# Patient Record
Sex: MALE | Race: WHITE | Employment: OTHER | URBAN - METROPOLITAN AREA
[De-identification: names, ages, dates, MRNs, and addresses within clinical notes are randomized per-mention and may not be internally consistent; named-entity substitution may affect disease eponyms.]

---

## 2017-06-06 ENCOUNTER — HOSPITAL ENCOUNTER (EMERGENCY)
Age: 62
Discharge: LEFT BEFORE DISCHARGE | End: 2017-06-06
Attending: EMERGENCY MEDICINE
Payer: COMMERCIAL

## 2017-06-06 VITALS
SYSTOLIC BLOOD PRESSURE: 157 MMHG | WEIGHT: 186.6 LBS | HEIGHT: 67 IN | DIASTOLIC BLOOD PRESSURE: 82 MMHG | RESPIRATION RATE: 16 BRPM | HEART RATE: 73 BPM | OXYGEN SATURATION: 96 % | BODY MASS INDEX: 29.29 KG/M2 | TEMPERATURE: 97.7 F

## 2017-06-06 PROCEDURE — 75810000275 HC EMERGENCY DEPT VISIT NO LEVEL OF CARE

## 2017-06-06 NOTE — ED TRIAGE NOTES
Patient arrives after falling Saturday from a ladder onto a stump approx 4 feet. Patient reports LLQ tenderness and bruising to scrotum. Denies difficulty with urination. Reports nausea but denies V/D. \"I basically would like a ultrasound. \" No acute distress noted in triage. VSS.

## 2018-12-20 ENCOUNTER — HOME HEALTH ADMISSION (OUTPATIENT)
Dept: HOME HEALTH SERVICES | Facility: HOME HEALTH | Age: 63
End: 2018-12-20
Payer: COMMERCIAL

## 2018-12-21 ENCOUNTER — HOME CARE VISIT (OUTPATIENT)
Dept: SCHEDULING | Facility: HOME HEALTH | Age: 63
End: 2018-12-21
Payer: COMMERCIAL

## 2018-12-21 VITALS
TEMPERATURE: 98.5 F | HEART RATE: 72 BPM | DIASTOLIC BLOOD PRESSURE: 67 MMHG | RESPIRATION RATE: 20 BRPM | OXYGEN SATURATION: 97 % | SYSTOLIC BLOOD PRESSURE: 130 MMHG

## 2018-12-21 PROCEDURE — 400013 HH SOC

## 2018-12-21 PROCEDURE — G0151 HHCP-SERV OF PT,EA 15 MIN: HCPCS

## 2018-12-24 ENCOUNTER — HOME CARE VISIT (OUTPATIENT)
Dept: SCHEDULING | Facility: HOME HEALTH | Age: 63
End: 2018-12-24
Payer: COMMERCIAL

## 2018-12-24 PROCEDURE — G0151 HHCP-SERV OF PT,EA 15 MIN: HCPCS

## 2018-12-26 VITALS
HEART RATE: 78 BPM | TEMPERATURE: 95.6 F | OXYGEN SATURATION: 98 % | DIASTOLIC BLOOD PRESSURE: 77 MMHG | RESPIRATION RATE: 21 BRPM | SYSTOLIC BLOOD PRESSURE: 131 MMHG

## 2018-12-28 ENCOUNTER — HOME CARE VISIT (OUTPATIENT)
Dept: SCHEDULING | Facility: HOME HEALTH | Age: 63
End: 2018-12-28
Payer: COMMERCIAL

## 2018-12-28 VITALS
OXYGEN SATURATION: 98 % | SYSTOLIC BLOOD PRESSURE: 125 MMHG | RESPIRATION RATE: 20 BRPM | HEART RATE: 76 BPM | TEMPERATURE: 97.5 F | DIASTOLIC BLOOD PRESSURE: 87 MMHG

## 2018-12-28 PROCEDURE — G0151 HHCP-SERV OF PT,EA 15 MIN: HCPCS

## 2019-01-02 ENCOUNTER — HOME CARE VISIT (OUTPATIENT)
Dept: SCHEDULING | Facility: HOME HEALTH | Age: 64
End: 2019-01-02
Payer: COMMERCIAL

## 2019-01-02 VITALS
SYSTOLIC BLOOD PRESSURE: 131 MMHG | OXYGEN SATURATION: 98 % | HEART RATE: 71 BPM | RESPIRATION RATE: 20 BRPM | DIASTOLIC BLOOD PRESSURE: 78 MMHG | TEMPERATURE: 97.9 F

## 2019-01-02 PROCEDURE — G0151 HHCP-SERV OF PT,EA 15 MIN: HCPCS

## 2022-04-29 ENCOUNTER — TRANSCRIBE ORDER (OUTPATIENT)
Dept: CARDIAC REHAB | Age: 67
End: 2022-04-29

## 2022-04-29 ENCOUNTER — HOSPITAL ENCOUNTER (OUTPATIENT)
Age: 67
Discharge: HOME | End: 2022-04-30
Attending: INTERNAL MEDICINE | Admitting: INTERNAL MEDICINE
Payer: MEDICARE

## 2022-04-29 DIAGNOSIS — R07.9 CHEST PAIN, UNSPECIFIED TYPE: ICD-10-CM

## 2022-04-29 PROBLEM — I20.8 ANGINA OF EFFORT (HCC): Status: ACTIVE | Noted: 2022-04-29

## 2022-04-29 PROBLEM — I20.89 ANGINA OF EFFORT: Status: ACTIVE | Noted: 2022-04-29

## 2022-04-29 LAB
ACT BLD: 309 SECS (ref 79–138)
ANION GAP SERPL CALC-SCNC: 4 MMOL/L (ref 5–15)
BUN SERPL-MCNC: 33 MG/DL (ref 6–20)
BUN/CREAT SERPL: 32 (ref 12–20)
CALCIUM SERPL-MCNC: 8.9 MG/DL (ref 8.5–10.1)
CHLORIDE SERPL-SCNC: 107 MMOL/L (ref 97–108)
CO2 SERPL-SCNC: 27 MMOL/L (ref 21–32)
CREAT SERPL-MCNC: 1.04 MG/DL (ref 0.7–1.3)
ERYTHROCYTE [DISTWIDTH] IN BLOOD BY AUTOMATED COUNT: 13.1 % (ref 11.5–14.5)
GLUCOSE SERPL-MCNC: 103 MG/DL (ref 65–100)
HCT VFR BLD AUTO: 48.9 % (ref 36.6–50.3)
HGB BLD-MCNC: 16.5 G/DL (ref 12.1–17)
MCH RBC QN AUTO: 32.5 PG (ref 26–34)
MCHC RBC AUTO-ENTMCNC: 33.7 G/DL (ref 30–36.5)
MCV RBC AUTO: 96.3 FL (ref 80–99)
NRBC # BLD: 0 K/UL (ref 0–0.01)
NRBC BLD-RTO: 0 PER 100 WBC
PLATELET # BLD AUTO: 262 K/UL (ref 150–400)
PMV BLD AUTO: 9 FL (ref 8.9–12.9)
POTASSIUM SERPL-SCNC: 4.2 MMOL/L (ref 3.5–5.1)
RBC # BLD AUTO: 5.08 M/UL (ref 4.1–5.7)
SODIUM SERPL-SCNC: 138 MMOL/L (ref 136–145)
WBC # BLD AUTO: 8.8 K/UL (ref 4.1–11.1)

## 2022-04-29 PROCEDURE — 85347 COAGULATION TIME ACTIVATED: CPT

## 2022-04-29 PROCEDURE — 92928 PRQ TCAT PLMT NTRAC ST 1 LES: CPT | Performed by: INTERNAL MEDICINE

## 2022-04-29 PROCEDURE — 77010033678 HC OXYGEN DAILY

## 2022-04-29 PROCEDURE — 93005 ELECTROCARDIOGRAM TRACING: CPT

## 2022-04-29 PROCEDURE — C1769 GUIDE WIRE: HCPCS | Performed by: INTERNAL MEDICINE

## 2022-04-29 PROCEDURE — C1894 INTRO/SHEATH, NON-LASER: HCPCS | Performed by: INTERNAL MEDICINE

## 2022-04-29 PROCEDURE — 77030042317 HC BND COMPR HEMSTAT -B: Performed by: INTERNAL MEDICINE

## 2022-04-29 PROCEDURE — 2709999900 HC NON-CHARGEABLE SUPPLY: Performed by: INTERNAL MEDICINE

## 2022-04-29 PROCEDURE — 77030008543 HC TBNG MON PRSS MRTM -A: Performed by: INTERNAL MEDICINE

## 2022-04-29 PROCEDURE — 36415 COLL VENOUS BLD VENIPUNCTURE: CPT

## 2022-04-29 PROCEDURE — 74011250637 HC RX REV CODE- 250/637: Performed by: STUDENT IN AN ORGANIZED HEALTH CARE EDUCATION/TRAINING PROGRAM

## 2022-04-29 PROCEDURE — 74011000636 HC RX REV CODE- 636: Performed by: INTERNAL MEDICINE

## 2022-04-29 PROCEDURE — C1887 CATHETER, GUIDING: HCPCS | Performed by: INTERNAL MEDICINE

## 2022-04-29 PROCEDURE — 77030030195 HC CATH ANGI DX PRF4 MRTM -A: Performed by: INTERNAL MEDICINE

## 2022-04-29 PROCEDURE — 77030019698 HC SYR ANGI MDLON MRTM -A: Performed by: INTERNAL MEDICINE

## 2022-04-29 PROCEDURE — 74011250637 HC RX REV CODE- 250/637: Performed by: INTERNAL MEDICINE

## 2022-04-29 PROCEDURE — 93458 L HRT ARTERY/VENTRICLE ANGIO: CPT | Performed by: INTERNAL MEDICINE

## 2022-04-29 PROCEDURE — 77030010221 HC SPLNT WR POS TELE -B: Performed by: INTERNAL MEDICINE

## 2022-04-29 PROCEDURE — 99153 MOD SED SAME PHYS/QHP EA: CPT | Performed by: INTERNAL MEDICINE

## 2022-04-29 PROCEDURE — C1725 CATH, TRANSLUMIN NON-LASER: HCPCS | Performed by: INTERNAL MEDICINE

## 2022-04-29 PROCEDURE — 80048 BASIC METABOLIC PNL TOTAL CA: CPT

## 2022-04-29 PROCEDURE — 74011250636 HC RX REV CODE- 250/636: Performed by: INTERNAL MEDICINE

## 2022-04-29 PROCEDURE — C1874 STENT, COATED/COV W/DEL SYS: HCPCS | Performed by: INTERNAL MEDICINE

## 2022-04-29 PROCEDURE — 77030019697 HC SYR ANGI INFL MRTM -B: Performed by: INTERNAL MEDICINE

## 2022-04-29 PROCEDURE — 99152 MOD SED SAME PHYS/QHP 5/>YRS: CPT | Performed by: INTERNAL MEDICINE

## 2022-04-29 PROCEDURE — 77030004549 HC CATH ANGI DX PRF MRTM -A: Performed by: INTERNAL MEDICINE

## 2022-04-29 PROCEDURE — 74011000250 HC RX REV CODE- 250: Performed by: INTERNAL MEDICINE

## 2022-04-29 PROCEDURE — 85027 COMPLETE CBC AUTOMATED: CPT

## 2022-04-29 DEVICE — STENT RONYX25022UX RESOLUTE ONYX 2.50X22
Type: IMPLANTABLE DEVICE | Status: FUNCTIONAL
Brand: RESOLUTE ONYX™

## 2022-04-29 RX ORDER — LIDOCAINE HYDROCHLORIDE 10 MG/ML
INJECTION, SOLUTION EPIDURAL; INFILTRATION; INTRACAUDAL; PERINEURAL AS NEEDED
Status: DISCONTINUED | OUTPATIENT
Start: 2022-04-29 | End: 2022-04-29 | Stop reason: HOSPADM

## 2022-04-29 RX ORDER — MELOXICAM 7.5 MG/1
15 TABLET ORAL DAILY
Status: DISCONTINUED | OUTPATIENT
Start: 2022-04-30 | End: 2022-04-30 | Stop reason: HOSPADM

## 2022-04-29 RX ORDER — ZOLPIDEM TARTRATE 5 MG/1
5 TABLET ORAL
Status: DISCONTINUED | OUTPATIENT
Start: 2022-04-29 | End: 2022-04-30 | Stop reason: HOSPADM

## 2022-04-29 RX ORDER — TRAZODONE HYDROCHLORIDE 50 MG/1
50 TABLET ORAL
Status: DISCONTINUED | OUTPATIENT
Start: 2022-04-29 | End: 2022-04-30 | Stop reason: HOSPADM

## 2022-04-29 RX ORDER — MIDAZOLAM HYDROCHLORIDE 1 MG/ML
INJECTION, SOLUTION INTRAMUSCULAR; INTRAVENOUS AS NEEDED
Status: DISCONTINUED | OUTPATIENT
Start: 2022-04-29 | End: 2022-04-29 | Stop reason: HOSPADM

## 2022-04-29 RX ORDER — LORAZEPAM 1 MG/1
1 TABLET ORAL
Status: DISCONTINUED | OUTPATIENT
Start: 2022-04-29 | End: 2022-04-30 | Stop reason: HOSPADM

## 2022-04-29 RX ORDER — FENTANYL CITRATE 50 UG/ML
INJECTION, SOLUTION INTRAMUSCULAR; INTRAVENOUS AS NEEDED
Status: DISCONTINUED | OUTPATIENT
Start: 2022-04-29 | End: 2022-04-29 | Stop reason: HOSPADM

## 2022-04-29 RX ORDER — CLOPIDOGREL BISULFATE 75 MG/1
TABLET ORAL AS NEEDED
Status: DISCONTINUED | OUTPATIENT
Start: 2022-04-29 | End: 2022-04-29 | Stop reason: HOSPADM

## 2022-04-29 RX ORDER — ANASTROZOLE 1 MG/1
1 TABLET ORAL
COMMUNITY

## 2022-04-29 RX ORDER — HEPARIN SODIUM 200 [USP'U]/100ML
INJECTION, SOLUTION INTRAVENOUS
Status: COMPLETED | OUTPATIENT
Start: 2022-04-29 | End: 2022-04-29

## 2022-04-29 RX ORDER — METOPROLOL SUCCINATE 50 MG/1
50 TABLET, EXTENDED RELEASE ORAL DAILY
Status: DISCONTINUED | OUTPATIENT
Start: 2022-04-30 | End: 2022-04-30 | Stop reason: HOSPADM

## 2022-04-29 RX ORDER — SODIUM CHLORIDE 0.9 % (FLUSH) 0.9 %
5-40 SYRINGE (ML) INJECTION EVERY 8 HOURS
Status: DISCONTINUED | OUTPATIENT
Start: 2022-04-29 | End: 2022-04-30 | Stop reason: HOSPADM

## 2022-04-29 RX ORDER — CLOPIDOGREL BISULFATE 75 MG/1
75 TABLET ORAL DAILY
Qty: 30 TABLET | Refills: 12 | Status: SHIPPED | OUTPATIENT
Start: 2022-04-30

## 2022-04-29 RX ORDER — CLOPIDOGREL BISULFATE 75 MG/1
75 TABLET ORAL DAILY
Status: DISCONTINUED | OUTPATIENT
Start: 2022-04-30 | End: 2022-04-30 | Stop reason: HOSPADM

## 2022-04-29 RX ORDER — HEPARIN SODIUM 1000 [USP'U]/ML
INJECTION, SOLUTION INTRAVENOUS; SUBCUTANEOUS AS NEEDED
Status: DISCONTINUED | OUTPATIENT
Start: 2022-04-29 | End: 2022-04-29 | Stop reason: HOSPADM

## 2022-04-29 RX ORDER — VERAPAMIL HYDROCHLORIDE 2.5 MG/ML
INJECTION, SOLUTION INTRAVENOUS AS NEEDED
Status: DISCONTINUED | OUTPATIENT
Start: 2022-04-29 | End: 2022-04-29 | Stop reason: HOSPADM

## 2022-04-29 RX ORDER — ACETAMINOPHEN 325 MG/1
650 TABLET ORAL
Status: DISCONTINUED | OUTPATIENT
Start: 2022-04-29 | End: 2022-04-30 | Stop reason: HOSPADM

## 2022-04-29 RX ORDER — TRAZODONE HYDROCHLORIDE 50 MG/1
50 TABLET ORAL
COMMUNITY

## 2022-04-29 RX ORDER — LOSARTAN POTASSIUM 50 MG/1
50 TABLET ORAL DAILY
COMMUNITY

## 2022-04-29 RX ORDER — TESTOSTERONE ENANTHATE 75 MG/.5ML
75 INJECTION SUBCUTANEOUS
COMMUNITY

## 2022-04-29 RX ORDER — GUAIFENESIN 100 MG/5ML
81 LIQUID (ML) ORAL DAILY
Status: DISCONTINUED | OUTPATIENT
Start: 2022-04-30 | End: 2022-04-30 | Stop reason: HOSPADM

## 2022-04-29 RX ORDER — SODIUM CHLORIDE 0.9 % (FLUSH) 0.9 %
5-40 SYRINGE (ML) INJECTION AS NEEDED
Status: DISCONTINUED | OUTPATIENT
Start: 2022-04-29 | End: 2022-04-30 | Stop reason: HOSPADM

## 2022-04-29 RX ORDER — FAMOTIDINE 20 MG/1
20 TABLET, FILM COATED ORAL DAILY
Status: DISCONTINUED | OUTPATIENT
Start: 2022-04-30 | End: 2022-04-30 | Stop reason: HOSPADM

## 2022-04-29 RX ORDER — SODIUM CHLORIDE 9 MG/ML
100 INJECTION, SOLUTION INTRAVENOUS CONTINUOUS
Status: DISPENSED | OUTPATIENT
Start: 2022-04-29 | End: 2022-04-29

## 2022-04-29 RX ORDER — LOSARTAN POTASSIUM 50 MG/1
50 TABLET ORAL DAILY
Status: DISCONTINUED | OUTPATIENT
Start: 2022-04-30 | End: 2022-04-30 | Stop reason: HOSPADM

## 2022-04-29 RX ORDER — METOPROLOL SUCCINATE 50 MG/1
50 TABLET, EXTENDED RELEASE ORAL DAILY
COMMUNITY

## 2022-04-29 RX ADMIN — ACETAMINOPHEN 325MG 650 MG: 325 TABLET ORAL at 20:57

## 2022-04-29 RX ADMIN — SODIUM CHLORIDE, PRESERVATIVE FREE 10 ML: 5 INJECTION INTRAVENOUS at 22:00

## 2022-04-29 RX ADMIN — TRAZODONE HYDROCHLORIDE 50 MG: 50 TABLET ORAL at 21:57

## 2022-04-29 NOTE — Clinical Note
Diagnostic catheter removed over the wire. Catheter used: Pigtail (straight). Balloon/Stent status: deployed.

## 2022-04-29 NOTE — PROCEDURES
Brief Procedure Note    Patient: Clarice Bower MRN: [id number]  SSN: [id number]    YOB: 1955  Age: 75 y.o. Sex: male      Date of Procedure: 4/29/2022     Pre-procedure Diagnosis: angina, CAD      Post-procedure Diagnosis: angina,  CAD     Procedure: L Cath . PCI of prox - Mid LAD     Performed By: Chaitanya Oden MD     Anesthesia: Moderate Sedation    Estimated Blood Loss: Less than 10 mL      Specimens:  None    Findings: 75 - 80% LAD stenosis just distal to Diagonal branch. Remainder of coronaries normal      Left Dominant . Normal LV      PCI of LAD with 2.5 x 22 Stopover VAN to 15 GRAHAM     Complications: None    Implants: Stopover VAN 2.5 x 22  prox - mid LAD . Recommendations: Continue medical therapy.     Signed By: Chaitanya Oden MD     April 29, 2022

## 2022-04-29 NOTE — H&P
Consult/Admission    NAME: Jackson Murray   :  1955   MRN:  [id number]     Date/Time:  2022 1:11 PM    Patient PCP: Chanell Sarah MD  ________________________________________________________________________      Patient seen and examined 12:30 pm    Procedure reviewed , all questions answered. Assessment:     Angina    CAD on recent CTA   Elevated Calcium score 1100 . Diffuse calcification   Hx Of Atrial arrhythmia. SVT . S/p Ablation   S/p Medtronic DDD pacer  . Hypertension   Hyperlipidemia   Family hx of CAD . S/p 7 artificial joint replacements. Last cardiac cath  did not show any obstructive disease. Intolerant of Statins       Plan:     Proceed with diagnostic cath ,   PCI if indicated      []           High complexity decision making was performed        Subjective:   CHIEF COMPLAINT: chest pain ,   SOB     HISTORY OF PRESENT ILLNESS:     Chari Wu is a 75 y.o.  male who has above diagnoses. Well known to me . Follows with DR Tash Garcia in our practice for his arrhythmias. Has recently been experiencing symptoms when exercising with chest discomfort , increased SOB ,  Persistent Tachycardia,  Profuse diaphoresis . Central chest symptoms , no radiation . CTA of coronaries done . He has heavily calcified coronaries. Suspicious for high grade proximal LAD stenosis .           Past Medical History:   Diagnosis Date    Arthritis     Chronic pain     bad arthritis    Hypertension     Kidney stone       Past Surgical History:   Procedure Laterality Date    HX HEENT      sinus, break nose     HX ORTHOPAEDIC      Bilat Knee Replacement, Bilat Shoulder Replaement     Allergies   Allergen Reactions    Penicillin G Unknown (comments)      Meds:  See below  Social History     Tobacco Use    Smoking status: Former Smoker     Quit date: 1993     Years since quittin.2    Smokeless tobacco: Not on file   Substance Use Topics    Alcohol use: No      Family History   Problem Relation Age of Onset    Heart Disease Mother     Heart Disease Father     Cancer Father         Prostate CA    Arrhythmia Sister         A Fib    Heart Disease Brother         MI 62       REVIEW OF SYSTEMS:     []            Unable to obtain  ROS due to ---   [x]            Total of 12 systems reviewed as follows:    Constitutional: negative fever, negative chills, negative weight loss  Eyes:   negative visual changes  ENT:   negative sore throat, tongue or lip swelling  Respiratory:  negative cough, negative dyspnea  Cards:  negative for chest pain, palpitations, lower extremity edema  GI:   negative for nausea, vomiting, diarrhea, and abdominal pain  Genitourinary: negative for frequency, dysuria  Integument:  negative for rash   Hematologic:  negative for easy bruising and gum/nose bleeding  Musculoskel: negative for myalgias,  back pain  Neurological:  negative for headaches, dizziness, vertigo, weakness  Behavl/Psych: negative for feelings of anxiety, depression     Pertinent Positives include :    Objective:      Physical Exam:    Last 24hrs VS reviewed since prior progress note. Most recent are:    Visit Vitals  BP (!) 173/80 (BP 1 Location: Left upper arm, BP Patient Position: At rest)   Pulse 60   Temp 98.2 °F (36.8 °C)   Resp 18   Ht 5' 7\" (1.702 m)   Wt 78.5 kg (173 lb)   SpO2 99%   BMI 27.10 kg/m²     No intake or output data in the 24 hours ending 04/29/22 1311     General Appearance: Well developed, well nourished, alert & oriented x 3,    no acute distress. Ears/Nose/Mouth/Throat: Pupils equal and round, Hearing grossly normal.  Neck: Supple. JVP within normal limits. Carotids good upstrokes, with no bruit. Chest: Lungs clear to auscultation bilaterally. Cardiovascular: Regular rate and rhythm, S1S2 normal, no murmur, rubs, gallops. Abdomen: Soft, non-tender, bowel sounds are active. No organomegaly. Extremities: No edema bilaterally.  Femoral pulses +2, Distal Pulses +1. Skin: Warm and dry. Neuro: CN II-XII grossly intact, Strength and sensation grossly intact. Data:      Prior to Admission medications    Medication Sig Start Date End Date Taking? Authorizing Provider   metoprolol succinate (TOPROL-XL) 50 mg XL tablet Take 50 mg by mouth daily. Yes Provider, Historical   losartan (COZAAR) 50 mg tablet Take 50 mg by mouth daily. Yes Provider, Historical   anastrozole (ARIMIDEX) 1 mg tablet Take 1 mg by mouth every seven (7) days. Yes Provider, Historical   traZODone (DESYREL) 50 mg tablet Take 50 mg by mouth nightly as needed for Sleep. Yes Provider, Historical   testosterone enanthate (Xyosted) 75 mg/0.5 mL atIn 75 mg by SubCUTAneous route every seven (7) days. Yes Provider, Historical   aspirin 81 mg chewable tablet Take 81 mg by mouth two (2) times a day. after the Lovenox injections end on 12/2/3/18   Yes To Austin MD   Esomeprazole Magnesium Take 40 mg by mouth daily. Yes Olena Mantilla MD   meloxicam (MOBIC) 15 mg tablet Take 15 mg by mouth daily. Yes Ana Rodriguez MD   hydrochlorothiazide (HYDRODIURIL) 50 mg tablet Take 25 mg by mouth daily. Yes Provider, Historical   famotidine (PEPCID) 20 mg tablet Take 20 mg by mouth daily.    Yes Provider, Historical       Recent Results (from the past 24 hour(s))   METABOLIC PANEL, BASIC    Collection Time: 04/29/22 10:39 AM   Result Value Ref Range    Sodium 138 136 - 145 mmol/L    Potassium 4.2 3.5 - 5.1 mmol/L    Chloride 107 97 - 108 mmol/L    CO2 27 21 - 32 mmol/L    Anion gap 4 (L) 5 - 15 mmol/L    Glucose 103 (H) 65 - 100 mg/dL    BUN 33 (H) 6 - 20 MG/DL    Creatinine 1.04 0.70 - 1.30 MG/DL    BUN/Creatinine ratio 32 (H) 12 - 20      GFR est AA >60 >60 ml/min/1.73m2    GFR est non-AA >60 >60 ml/min/1.73m2    Calcium 8.9 8.5 - 10.1 MG/DL   CBC W/O DIFF    Collection Time: 04/29/22 10:39 AM   Result Value Ref Range    WBC 8.8 4.1 - 11.1 K/uL    RBC 5.08 4.10 - 5.70 M/uL    HGB 16.5 12.1 - 17.0 g/dL    HCT 48.9 36.6 - 50.3 %    MCV 96.3 80.0 - 99.0 FL    MCH 32.5 26.0 - 34.0 PG    MCHC 33.7 30.0 - 36.5 g/dL    RDW 13.1 11.5 - 14.5 %    PLATELET 125 069 - 062 K/uL    MPV 9.0 8.9 - 12.9 FL    NRBC 0.0 0  WBC    ABSOLUTE NRBC 0.00 0.00 - 0.01 K/uL

## 2022-04-29 NOTE — PROGRESS NOTES
Cardiac Cath Lab Recovery Arrival Note:      Clarice Bower arrived to Cardiac Cath Lab, Recovery Area. Staff introduced to patient. Patient identifiers verified with NAME and DATE OF BIRTH. Procedure verified with patient. Consent forms reviewed and signed by patient or authorized representative and verified. Allergies verified. Patient and family oriented to department. Patient and family informed of procedure and plan of care. Questions answered with review. Patient prepped for procedure, per orders from physician, prior to arrival.    Patient on cardiac monitor, non-invasive blood pressure, SPO2 monitor. On RA. Patient is A&Ox 4. Patient reports no complaints. Primary Nurse Teresa Gutierrez RN and THOMAS Doyle RN performed a dual skin assessment on this patient No impairment noted  Jaret score is 23, refused mepilex to sacrum/coccyx      Patient in stretcher, in low position, with side rails up, call bell within reach, patient instructed to call if assistance as needed. Patient prep in: [address], [place]. Patient family has pager #   Family in: waiting room, wife Cathy Cueva.    Prep by: Jono Cisneros RN's

## 2022-04-29 NOTE — CARDIO/PULMONARY
Cardiac Rehab Note: chart review/referral     Consult has been acknowledged     Cardiac cath with intervention 4/29/22    Smoking history assessed. Patient is a former smoker. Smoking Cessation Program link has not been added to the AVS.     CAD education folder, with heart heathy diet, warning signs, heart facts, catheterization brochure, and out patient cardiac rehab program provided to Lizbeth Barahona on 4/29/22                                              Educated using teach back method. Reviewed CAD diagnosis definition and purpose of intervention. Discussed risk factors for CAD to include the following: family history, elevated BMI, hyperlipidemia, hypertension, diabetes, and stress. Discussed Heart Healthy/Low Sodium (less than 2000 mg) diet. Reviewed the importance of medication compliance, follow up appointments with cardiologist, signs and symptoms of angina, and what to report to physician after discharge. Emphasized the value of cardiac rehab. Discussed Cardiac Rehab Program format, benefits, and encouraged enrollment to assist with risk modification and management. Patient lives closer to [facility] location and would prefer to enroll there. Lizbeth Jun verbalized understanding with questions answered.   Dave Garcias RN

## 2022-04-29 NOTE — Clinical Note
TRANSFER - OUT REPORT:     Verbal report given to: [facility]. Report consisted of patient's Situation, Background, Assessment and   Recommendations(SBAR). Opportunity for questions and clarification was provided. Patient transported with a Registered Nurse. Patient transported to: [facility].

## 2022-04-30 VITALS
BODY MASS INDEX: 27.23 KG/M2 | RESPIRATION RATE: 18 BRPM | SYSTOLIC BLOOD PRESSURE: 171 MMHG | HEART RATE: 60 BPM | TEMPERATURE: 97.6 F | HEIGHT: 67 IN | DIASTOLIC BLOOD PRESSURE: 89 MMHG | OXYGEN SATURATION: 99 % | WEIGHT: 173.5 LBS

## 2022-04-30 LAB
ANION GAP SERPL CALC-SCNC: 4 MMOL/L (ref 5–15)
BASOPHILS # BLD: 0.1 K/UL (ref 0–0.1)
BASOPHILS NFR BLD: 1 % (ref 0–1)
BUN SERPL-MCNC: 24 MG/DL (ref 6–20)
BUN/CREAT SERPL: 26 (ref 12–20)
CALCIUM SERPL-MCNC: 8.7 MG/DL (ref 8.5–10.1)
CHLORIDE SERPL-SCNC: 105 MMOL/L (ref 97–108)
CO2 SERPL-SCNC: 25 MMOL/L (ref 21–32)
CREAT SERPL-MCNC: 0.92 MG/DL (ref 0.7–1.3)
DIFFERENTIAL METHOD BLD: ABNORMAL
EOSINOPHIL # BLD: 0.3 K/UL (ref 0–0.4)
EOSINOPHIL NFR BLD: 4 % (ref 0–7)
ERYTHROCYTE [DISTWIDTH] IN BLOOD BY AUTOMATED COUNT: 12.9 % (ref 11.5–14.5)
GLUCOSE SERPL-MCNC: 149 MG/DL (ref 65–100)
HCT VFR BLD AUTO: 49.1 % (ref 36.6–50.3)
HGB BLD-MCNC: 16.8 G/DL (ref 12.1–17)
IMM GRANULOCYTES # BLD AUTO: 0.1 K/UL (ref 0–0.04)
IMM GRANULOCYTES NFR BLD AUTO: 1 % (ref 0–0.5)
LYMPHOCYTES # BLD: 1.6 K/UL (ref 0.8–3.5)
LYMPHOCYTES NFR BLD: 22 % (ref 12–49)
MCH RBC QN AUTO: 32.9 PG (ref 26–34)
MCHC RBC AUTO-ENTMCNC: 34.2 G/DL (ref 30–36.5)
MCV RBC AUTO: 96.1 FL (ref 80–99)
MONOCYTES # BLD: 0.5 K/UL (ref 0–1)
MONOCYTES NFR BLD: 7 % (ref 5–13)
NEUTS SEG # BLD: 4.8 K/UL (ref 1.8–8)
NEUTS SEG NFR BLD: 65 % (ref 32–75)
NRBC # BLD: 0 K/UL (ref 0–0.01)
NRBC BLD-RTO: 0 PER 100 WBC
PLATELET # BLD AUTO: 267 K/UL (ref 150–400)
PMV BLD AUTO: 9.3 FL (ref 8.9–12.9)
POTASSIUM SERPL-SCNC: 4 MMOL/L (ref 3.5–5.1)
RBC # BLD AUTO: 5.11 M/UL (ref 4.1–5.7)
SODIUM SERPL-SCNC: 134 MMOL/L (ref 136–145)
WBC # BLD AUTO: 7.4 K/UL (ref 4.1–11.1)

## 2022-04-30 PROCEDURE — 80048 BASIC METABOLIC PNL TOTAL CA: CPT

## 2022-04-30 PROCEDURE — 74011000250 HC RX REV CODE- 250: Performed by: INTERNAL MEDICINE

## 2022-04-30 PROCEDURE — 36415 COLL VENOUS BLD VENIPUNCTURE: CPT

## 2022-04-30 PROCEDURE — 74011250637 HC RX REV CODE- 250/637: Performed by: INTERNAL MEDICINE

## 2022-04-30 PROCEDURE — 85025 COMPLETE CBC W/AUTO DIFF WBC: CPT

## 2022-04-30 RX ADMIN — SODIUM CHLORIDE, PRESERVATIVE FREE 10 ML: 5 INJECTION INTRAVENOUS at 06:09

## 2022-04-30 RX ADMIN — CLOPIDOGREL BISULFATE 75 MG: 75 TABLET ORAL at 08:07

## 2022-04-30 RX ADMIN — METOPROLOL SUCCINATE 50 MG: 50 TABLET, FILM COATED, EXTENDED RELEASE ORAL at 08:07

## 2022-04-30 RX ADMIN — FAMOTIDINE 20 MG: 20 TABLET ORAL at 08:07

## 2022-04-30 RX ADMIN — ASPIRIN 81 MG: 81 TABLET, CHEWABLE ORAL at 08:07

## 2022-04-30 RX ADMIN — LOSARTAN POTASSIUM 50 MG: 50 TABLET, FILM COATED ORAL at 08:07

## 2022-04-30 NOTE — DISCHARGE SUMMARY
Cardiology Discharge Summary     Patient ID: Delmis Macario  [id number]  45 y.o.  1955    Admit Date: 4/29/2022  Discharge Date: 4/30/2022   Admitting Physician: Oscar Sandoval MD   Discharge Physician: Nora Anderson MD    Admission Diagnoses: Chest pain, unspecified type [R07.9]  Angina of effort [facility]) [I20.8]    Discharge Diagnoses: Active Problems:    Angina of effort [facility]) (4/29/2022)         Hospital Course:  Admitted for elective cath for angina with Dr Trisha Kraus. Severe prox LAD disease s/p PCI. Discharged on ASA and plavix. No overnight event. No CP or sob. Radial access site unremarkable. Visit Vitals  BP (!) 171/89   Pulse 60   Temp (P) 97.7 °F (36.5 °C)   Resp (P) 19   Ht 5' 7\" (1.702 m)   Wt 78.7 kg (173 lb 8 oz)   SpO2 99%   BMI 27.17 kg/m²       Examination :    radial access site okay     Recent Labs     04/30/22  0440 04/29/22  1039   * 138   K 4.0 4.2   BUN 24* 33*   CREA 0.92 1.04   WBC 7.4 8.8   HGB 16.8 16.5   HCT 49.1 48.9    262      Patient was ambulating without symptoms prior to d/c. Patient Instructions:   Current Discharge Medication List      START taking these medications    Details   clopidogreL (PLAVIX) 75 mg tab Take 1 Tablet by mouth daily. Qty: 30 Tablet, Refills: 12         CONTINUE these medications which have NOT CHANGED    Details   metoprolol succinate (TOPROL-XL) 50 mg XL tablet Take 50 mg by mouth daily. losartan (COZAAR) 50 mg tablet Take 50 mg by mouth daily. anastrozole (ARIMIDEX) 1 mg tablet Take 1 mg by mouth every seven (7) days. traZODone (DESYREL) 50 mg tablet Take 50 mg by mouth nightly as needed for Sleep. testosterone enanthate (Xyosted) 75 mg/0.5 mL atIn 75 mg by SubCUTAneous route every seven (7) days. aspirin 81 mg chewable tablet Take 81 mg by mouth two (2) times a day. after the Lovenox injections end on 12/2/3/18      Esomeprazole Magnesium Take 40 mg by mouth daily.       meloxicam (MOBIC) 15 mg tablet Take 15 mg by mouth daily. hydrochlorothiazide (HYDRODIURIL) 50 mg tablet Take 25 mg by mouth daily. famotidine (PEPCID) 20 mg tablet Take 20 mg by mouth daily. Referenced discharge instructions provided by nursing for diet and activity. Follow-up:   Follow-up Information     Follow up With Specialties Details Why Contact Info Additional Information    [address] Cardiac Rehabilitation  To discuss participation in cardiac rehab program, call to schedule intake appointment. [address] [facility] [address] [facility], [address]. Please arrive 15 minutes prior to your appointment time and you will register in the [place] [address], [address], on the first floor of the [address] Telephone: [phone] Fax: [phone] Driving directions To [facility] [place]. Building: Driving WEST on [address], take exit [address] to [address]. Turn left onto [facility], then turn right into [employer] Parking lot Driving EAST on [address], take exit [address] [facility]. Turn right at the end of the exit ramp. Turn left onto [facility], then turn right into [employer] lot.     Josseline Wong MD [facility]   [address] [facility] [address]  [address] [facility] [address] [facility]       Nithya Brown MD Cardio Vascular Surgery, Interventional Cardiology, Cardiology In 3 weeks  [address]  [address]  [address] [phone]               > 30 minutes coordinating discharge  Yes     Signed:  Floyd Sigala MD  4/30/2022  8:10 AM

## 2022-05-01 LAB
ATRIAL RATE: 60 BPM
CALCULATED P AXIS, ECG09: -11 DEGREES
CALCULATED R AXIS, ECG10: 7 DEGREES
CALCULATED T AXIS, ECG11: -29 DEGREES
DIAGNOSIS, 93000: NORMAL
P-R INTERVAL, ECG05: 388 MS
Q-T INTERVAL, ECG07: 392 MS
QRS DURATION, ECG06: 82 MS
QTC CALCULATION (BEZET), ECG08: 392 MS
VENTRICULAR RATE, ECG03: 60 BPM

## 2022-05-02 ENCOUNTER — TRANSCRIBE ORDER (OUTPATIENT)
Dept: CARDIAC REHAB | Age: 67
End: 2022-05-02

## 2022-05-02 DIAGNOSIS — Z95.5 STENTED CORONARY ARTERY: Primary | ICD-10-CM

## 2022-05-25 ENCOUNTER — HOSPITAL ENCOUNTER (OUTPATIENT)
Dept: CARDIAC REHAB | Age: 67
Discharge: HOME | End: 2022-05-25
Payer: MEDICARE

## 2022-05-25 VITALS — WEIGHT: 175.8 LBS | BODY MASS INDEX: 27.59 KG/M2 | HEIGHT: 67 IN

## 2022-05-25 PROCEDURE — 93798 PHYS/QHP OP CAR RHAB W/ECG: CPT

## 2022-05-25 PROCEDURE — 93797 PHYS/QHP OP CAR RHAB WO ECG: CPT

## 2022-05-25 RX ORDER — ACETAMINOPHEN 500 MG
1000 TABLET ORAL 2 TIMES DAILY
COMMUNITY

## 2022-05-25 RX ORDER — CELECOXIB 200 MG/1
200 CAPSULE ORAL DAILY
COMMUNITY

## 2022-05-25 NOTE — CARDIO/PULMONARY
Sin León  75 y.o. presented to cardiac wellness for orientation and six minute walk test today with a primary diagnosis of stent placed on 4/29/2022. EF is 55-60%. Sin León has a history of CAD, HTN, arthritis, TKA x3 and multiple shoulder replacement surgeries, kidney stones (removal). Cardiac risk factors include smoking/ tobacco exposure, family history, dyslipidemia, hypertension, stress and these were reviewed with patient. Sin León lives with wife and has 2 adult children locally as well as a good support group of friends/family in the area. Patient is a retired dermatologist.   PHQ9, depression score, is 6 and this is considered mild. The result was discussed with patient who affirms score to be accurate. Patient denied chest pain or SOB during 6 minute walk and was Paced with no ectopy noted. Sin León will attend a 60 minute class once a week and will exercise 2 times weekly in cardiac wellness. An education manual was given to the patient and reviewed briefly.     Shauna Hunter RN  5/25/2022

## 2022-06-01 ENCOUNTER — HOSPITAL ENCOUNTER (OUTPATIENT)
Dept: CARDIAC REHAB | Age: 67
Discharge: HOME | End: 2022-06-01
Payer: MEDICARE

## 2022-06-01 VITALS — WEIGHT: 174 LBS | BODY MASS INDEX: 27.25 KG/M2

## 2022-06-01 PROCEDURE — 93798 PHYS/QHP OP CAR RHAB W/ECG: CPT

## 2022-06-01 PROCEDURE — 93797 PHYS/QHP OP CAR RHAB WO ECG: CPT

## 2022-06-06 ENCOUNTER — TELEPHONE (OUTPATIENT)
Dept: CARDIAC REHAB | Age: 67
End: 2022-06-06

## 2022-06-06 NOTE — TELEPHONE ENCOUNTER
Jazzy Manuel did not come in to Cardiac Rehab for his appointment today, 6/6/22. He was a no call/no show. I left a voicemail to check on his status. Will continue to follow up.   Kirsten Petty RN, BSN

## 2022-06-08 ENCOUNTER — HOSPITAL ENCOUNTER (OUTPATIENT)
Dept: CARDIAC REHAB | Age: 67
Discharge: HOME | End: 2022-06-08
Payer: MEDICARE

## 2022-06-08 VITALS — WEIGHT: 175 LBS | BODY MASS INDEX: 27.41 KG/M2

## 2022-06-08 PROCEDURE — 93798 PHYS/QHP OP CAR RHAB W/ECG: CPT

## 2022-06-08 PROCEDURE — 93797 PHYS/QHP OP CAR RHAB WO ECG: CPT

## 2022-06-15 ENCOUNTER — APPOINTMENT (OUTPATIENT)
Dept: CARDIAC REHAB | Age: 67
End: 2022-06-15
Payer: MEDICARE

## 2022-06-20 ENCOUNTER — HOSPITAL ENCOUNTER (OUTPATIENT)
Dept: CARDIAC REHAB | Age: 67
Discharge: HOME | End: 2022-06-20
Payer: MEDICARE

## 2022-06-20 VITALS — BODY MASS INDEX: 27.25 KG/M2 | WEIGHT: 174 LBS

## 2022-06-20 PROCEDURE — 93798 PHYS/QHP OP CAR RHAB W/ECG: CPT

## 2022-06-20 PROCEDURE — 93797 PHYS/QHP OP CAR RHAB WO ECG: CPT | Performed by: DIETITIAN, REGISTERED

## 2022-06-20 NOTE — CARDIO/PULMONARY
Delmis Macario is having arrhythmia issues (SVT, A Fib) and his Cardiac Rehab will be on hold for the next few weeks until his cardiologist determines plan of care. He will call us in the next few weeks with an update and will make a return appointment. Will continue to follow up.

## 2022-06-20 NOTE — CARDIO/PULMONARY
Cardiac Rehab Nutrition Assessment - 1:1 Evaluation           NAME: Derek Greer : 1955 AGE: 75 y.o. GENDER: male  CARDIAC REHAB ADMITTING DIAGNOSIS: stent (22)    PROBLEM LIST:  Patient Active Problem List   Diagnosis Code    Ureteric calculus N20.1    Angina of effort ([facility] [address] I20.8       H/o CAD, HTN, arthritis, dyslipidemia and kidney stones (per chart review; cardiac rehab intake note)    LABS:   No results found for: HBA1C, VIB8ATCK, TPJ8CHZM  No results found for: CHOL, CHOLPOCT, CHOLX, CHLST, CHOLV, HDL, HDLPOC, HDLP, LDL, LDLCPOC, LDLC, DLDLP, VLDLC, VLDL, TGLX, TRIGL, TRIGP, TGLPOCT, CHHD, CHHDX    22 Total cholesterol 247, HDL 49, .8, Triglycerides 66, Non-    MEDICATIONS/SUPPLEMENTS:   [unfilled]  Prior to Admission medications    Medication Sig Start Date End Date Taking? Authorizing Provider   celecoxib (CeleBREX) 200 mg capsule Take 200 mg by mouth daily. Provider, Historical   acetaminophen (Tylenol Extra Strength) 500 mg tablet Take 1,000 mg by mouth two (2) times a day. Provider, Historical   OTHER,NON-FORMULARY, Take 50 mg by mouth daily. Provider, Historical   metoprolol succinate (TOPROL-XL) 50 mg XL tablet Take 50 mg by mouth daily. Provider, Historical   losartan (COZAAR) 50 mg tablet Take 50 mg by mouth daily. Provider, Historical   anastrozole (ARIMIDEX) 1 mg tablet Take 1 mg by mouth every seven (7) days. Provider, Historical   traZODone (DESYREL) 50 mg tablet Take 50 mg by mouth nightly as needed for Sleep. Provider, Historical   testosterone enanthate (Xyosted) 75 mg/0.5 mL atIn 75 mg by SubCUTAneous route every seven (7) days. Provider, Historical   clopidogreL (PLAVIX) 75 mg tab Take 1 Tablet by mouth daily. 22   Shakila Parrish MD   aspirin 81 mg chewable tablet Take 81 mg by mouth daily. after the Lovenox injections end on 12/2/3/18    To Austin MD   Esomeprazole Magnesium Take 40 mg by mouth daily.   Patient not taking: Reported on 5/25/2022    Cassia Waters MD   meloxicam (MOBIC) 15 mg tablet Take 15 mg by mouth daily. Patient not taking: Reported on 5/25/2022    OtherAna MD   hydrochlorothiazide (HYDRODIURIL) 50 mg tablet Take 25 mg by mouth daily. Provider, Historical   famotidine (PEPCID) 20 mg tablet Take 20 mg by mouth daily. Provider, Historical     Statin intolerant      ANTHROPOMETRICS:    Ht Readings from Last 1 Encounters:   05/25/22 5' 7\" (1.702 m)      Wt Readings from Last 10 Encounters:   06/08/22 79.4 kg (175 lb)   06/01/22 78.9 kg (174 lb)   05/25/22 79.7 kg (175 lb 12.8 oz)   04/30/22 78.7 kg (173 lb 8 oz)   11/12/16 86.1 kg (189 lb 12.8 oz)   01/29/15 83.9 kg (185 lb)   08/26/14 79.4 kg (175 lb)   02/21/13 81.6 kg (180 lb)      IBW:148 # +/- 10%  %IBW: 118 % +/- 10%    BMI: 27.4 kg/M2 Category: overweight  Waist: 39 inches    Reported Wt Hx: lost 15 lbs with medication side effect (loss of taste for 4 months) about 18 months ago, gained about 5 lbs back     Reported Diet Hx:    Rate Your Plate Score: 43  (Score 58-72: Making many healthy choices; 41-57: Some choices need improving 24-40: many choices need improving)    24 HOUR DIET RECALL (yesterday was holiday and just off from vacation so no 24-hour recall)  Breakfast Oatmeal with nuts, prunes   Lunch Apples, pretzels   Dinner Meat and vegetable    Snacks Prefers salty over sweet (except black licorice) - tries to choose fruit or pretzels now and limit overall, maybe an Atkins bar   Beverages Flavored seltzer water (no caffeine), diet gingerale or root beer      Jose Player states had been a low carb dieter for many years but has now since his stent changed diet: oatmeal and nuts for breakfast, no meat at lunch - will eat pretzels with carrots or apples; stopped all the salami / ham / byrd, dinner will contain meat - beef, pork, chicken, fish. Vegetables include a salad nightly and a cooked side such as creamed spinach.   May snack on carrots during the day. States not willing to be a strict vegan, not willing to read labels. Restaurants about once or twice a week. Has a cook who prepares a month of meals for them and will be receptive to making it heart healthy. Reports \"gluten sensitive\" - diarrhea / bloat if eats regular bread for multiple days in a row. Snacks used to be salami, pepperoni, but now gluten free pretzels or an apple. Tries to not snack much - harder now that retired, more boredom snacking. In general does not drink milk unless eating PB&J or cake, dislikes yogurt, used to eat a lot of cheese but limiting now. Environmental/Social: retired, lives with his wife; reports exercising daily for 60-90 minutes on an elliptical or walks for at least 2 hours        NUTRITION INTERVENTION:  Nutrition 60 minute one-on-one education & goal setting with [address] [place] [address] with Sin León relevant labs compared to ideals. Reviewed weight history and patient's verbalized weight goal as well as any real or perceived barriers to obtaining the goal. Collaborated with patient to set a specific short and long term weight goal.     Reviewed Rate Your Plate and conducted a verbal diet recall. Assessed for environmental, financial, psychosocial, physical and comorbidities that may impact the food and eating patterns / behaviors of Niels Ramachandran with patient to set specific nutrient goals as well as specific food / behavior changes that will help patient meet the overall goal of following a heart healthy eating pattern (using guidelines as set forth by the American Heart Association and modeled after healthful eating patterns as recognized by the USDA Dietary Guidelines such as DASH, Mediterranean or plant-based).     Briefly reviewed with Sin León the nutrition information in the Cardiac Rehab patient education book and encouraged Sin León to read thoroughly, ask questions as needed, and use for future reference for heart healthy nutrition information. Gm Grewal is scheduled to participate in Cardiac Rehab group nutrition classes. PATIENT GOALS:    Weight Goals:  Short Term Weight Goal:170 lbs  Long Term Weight Goal: 160-165 lbs    Nutrition Goals:  Patient declined specific numbers    Other:  - continue changes made   - make fruits / vegetables half of each meal  - include a protein source at each meal (list provided); e.g. glass of milk with lunch  - mediterranean style diet pattern (high omega 3 fish at least twice a week)  - continue eating oatmeal most days and consider adding in Benecol tub spread daily for additional cholesterol lowering benefits         Questions addressed. Follow-up plans discussed. Gm Grewal verbalized understanding.             Gennaro Patel RD

## 2022-06-22 ENCOUNTER — APPOINTMENT (OUTPATIENT)
Dept: CARDIAC REHAB | Age: 67
End: 2022-06-22
Payer: MEDICARE

## 2022-06-29 ENCOUNTER — APPOINTMENT (OUTPATIENT)
Dept: CARDIAC REHAB | Age: 67
End: 2022-06-29
Payer: MEDICARE

## 2022-07-06 ENCOUNTER — APPOINTMENT (OUTPATIENT)
Dept: CARDIAC REHAB | Age: 67
End: 2022-07-06

## 2022-07-11 ENCOUNTER — APPOINTMENT (OUTPATIENT)
Dept: CARDIAC REHAB | Age: 67
End: 2022-07-11

## 2022-07-13 ENCOUNTER — APPOINTMENT (OUTPATIENT)
Dept: CARDIAC REHAB | Age: 67
End: 2022-07-13

## 2022-07-18 ENCOUNTER — APPOINTMENT (OUTPATIENT)
Dept: CARDIAC REHAB | Age: 67
End: 2022-07-18

## 2022-07-18 ENCOUNTER — TELEPHONE (OUTPATIENT)
Dept: CARDIAC REHAB | Age: 67
End: 2022-07-18

## 2022-07-18 NOTE — TELEPHONE ENCOUNTER
7/18/2022: Cardiac Wellness: Called Darrow Sacks regarding absence from the Cardiac Wellness Program. Left voicemail message.  Geraldine Kramer

## 2022-07-19 ENCOUNTER — TELEPHONE (OUTPATIENT)
Dept: CARDIAC REHAB | Age: 67
End: 2022-07-19

## 2022-07-19 NOTE — TELEPHONE ENCOUNTER
7/19/2022 Cardiac Wellness: Mr. Kelley Cleary returned my call about his absence from CR. He is still having rhythm issues and is set for an ablation and a back up plan for a cath procedure if the ablation does not stay corrected. Ger Wolfe    7/18/2022: Cardiac Wellness: Jv Pritcharde regarding absence from the Cardiac Wellness Program. Left voicemail message. Ankit Simmons7/18/2022: Cardiac Wellness: Jv Pritcharde regarding absence from the Cardiac Wellness Program. Left voicemail message.  Ger Wofle

## 2022-07-20 ENCOUNTER — APPOINTMENT (OUTPATIENT)
Dept: CARDIAC REHAB | Age: 67
End: 2022-07-20

## 2022-07-25 ENCOUNTER — APPOINTMENT (OUTPATIENT)
Dept: CARDIAC REHAB | Age: 67
End: 2022-07-25

## 2022-07-27 ENCOUNTER — APPOINTMENT (OUTPATIENT)
Dept: CARDIAC REHAB | Age: 67
End: 2022-07-27

## 2022-08-01 ENCOUNTER — APPOINTMENT (OUTPATIENT)
Dept: CARDIAC REHAB | Age: 67
End: 2022-08-01

## 2022-08-03 ENCOUNTER — APPOINTMENT (OUTPATIENT)
Dept: CARDIAC REHAB | Age: 67
End: 2022-08-03

## 2022-08-08 ENCOUNTER — APPOINTMENT (OUTPATIENT)
Dept: CARDIAC REHAB | Age: 67
End: 2022-08-08

## 2022-08-10 ENCOUNTER — APPOINTMENT (OUTPATIENT)
Dept: CARDIAC REHAB | Age: 67
End: 2022-08-10

## 2022-08-15 ENCOUNTER — APPOINTMENT (OUTPATIENT)
Dept: CARDIAC REHAB | Age: 67
End: 2022-08-15

## 2022-08-17 ENCOUNTER — APPOINTMENT (OUTPATIENT)
Dept: CARDIAC REHAB | Age: 67
End: 2022-08-17

## 2022-08-22 ENCOUNTER — APPOINTMENT (OUTPATIENT)
Dept: CARDIAC REHAB | Age: 67
End: 2022-08-22

## 2022-08-29 ENCOUNTER — APPOINTMENT (OUTPATIENT)
Dept: CARDIAC REHAB | Age: 67
End: 2022-08-29

## 2022-09-12 NOTE — CARDIO/PULMONARY
Ludmila Ferrojackie  75 y.o. With a diagnosis of stent, Ludmila Black attended Phase II Cardiac Rehab for 8 sessions. He spoke with us on the phone today and does not wish to return and complete cardiac rehab. He has been discharged.     Courtney Vega RN  9/12/2022

## 2023-08-24 ENCOUNTER — HOSPITAL ENCOUNTER (OUTPATIENT)
Facility: HOSPITAL | Age: 68
Discharge: HOME | End: 2023-08-24
Attending: INTERNAL MEDICINE | Admitting: INTERNAL MEDICINE
Payer: MEDICARE

## 2023-08-24 VITALS
BODY MASS INDEX: 28.56 KG/M2 | HEIGHT: 67 IN | RESPIRATION RATE: 18 BRPM | OXYGEN SATURATION: 96 % | HEART RATE: 71 BPM | SYSTOLIC BLOOD PRESSURE: 154 MMHG | WEIGHT: 182 LBS | DIASTOLIC BLOOD PRESSURE: 84 MMHG | TEMPERATURE: 97.9 F

## 2023-08-24 DIAGNOSIS — I25.10 CVD (CARDIOVASCULAR DISEASE): ICD-10-CM

## 2023-08-24 LAB — ECHO BSA: 1.98 M2

## 2023-08-24 PROCEDURE — 2709999900 HC NON-CHARGEABLE SUPPLY: Performed by: INTERNAL MEDICINE

## 2023-08-24 PROCEDURE — C1713 ANCHOR/SCREW BN/BN,TIS/BN: HCPCS | Performed by: INTERNAL MEDICINE

## 2023-08-24 PROCEDURE — 99152 MOD SED SAME PHYS/QHP 5/>YRS: CPT | Performed by: INTERNAL MEDICINE

## 2023-08-24 PROCEDURE — C1894 INTRO/SHEATH, NON-LASER: HCPCS | Performed by: INTERNAL MEDICINE

## 2023-08-24 PROCEDURE — 93458 L HRT ARTERY/VENTRICLE ANGIO: CPT | Performed by: INTERNAL MEDICINE

## 2023-08-24 PROCEDURE — 6360000004 HC RX CONTRAST MEDICATION: Performed by: INTERNAL MEDICINE

## 2023-08-24 PROCEDURE — 6360000002 HC RX W HCPCS: Performed by: INTERNAL MEDICINE

## 2023-08-24 PROCEDURE — 2500000003 HC RX 250 WO HCPCS: Performed by: INTERNAL MEDICINE

## 2023-08-24 RX ORDER — ACETAMINOPHEN 325 MG/1
650 TABLET ORAL EVERY 4 HOURS PRN
Status: DISCONTINUED | OUTPATIENT
Start: 2023-08-24 | End: 2023-08-24 | Stop reason: HOSPADM

## 2023-08-24 RX ORDER — LIDOCAINE HYDROCHLORIDE 10 MG/ML
INJECTION, SOLUTION INFILTRATION; PERINEURAL PRN
Status: DISCONTINUED | OUTPATIENT
Start: 2023-08-24 | End: 2023-08-24 | Stop reason: HOSPADM

## 2023-08-24 RX ORDER — METOPROLOL SUCCINATE 50 MG/1
50 TABLET, EXTENDED RELEASE ORAL DAILY
COMMUNITY

## 2023-08-24 RX ORDER — SODIUM CHLORIDE 0.9 % (FLUSH) 0.9 %
5-40 SYRINGE (ML) INJECTION PRN
Status: DISCONTINUED | OUTPATIENT
Start: 2023-08-24 | End: 2023-08-24 | Stop reason: HOSPADM

## 2023-08-24 RX ORDER — SODIUM CHLORIDE 9 MG/ML
INJECTION, SOLUTION INTRAVENOUS PRN
Status: DISCONTINUED | OUTPATIENT
Start: 2023-08-24 | End: 2023-08-24 | Stop reason: HOSPADM

## 2023-08-24 RX ORDER — FENTANYL CITRATE 50 UG/ML
INJECTION, SOLUTION INTRAMUSCULAR; INTRAVENOUS PRN
Status: DISCONTINUED | OUTPATIENT
Start: 2023-08-24 | End: 2023-08-24 | Stop reason: HOSPADM

## 2023-08-24 RX ORDER — VERAPAMIL HYDROCHLORIDE 2.5 MG/ML
INJECTION, SOLUTION INTRAVENOUS PRN
Status: DISCONTINUED | OUTPATIENT
Start: 2023-08-24 | End: 2023-08-24 | Stop reason: HOSPADM

## 2023-08-24 RX ORDER — HEPARIN SODIUM 1000 [USP'U]/ML
INJECTION, SOLUTION INTRAVENOUS; SUBCUTANEOUS PRN
Status: DISCONTINUED | OUTPATIENT
Start: 2023-08-24 | End: 2023-08-24 | Stop reason: HOSPADM

## 2023-08-24 RX ORDER — SODIUM CHLORIDE 0.9 % (FLUSH) 0.9 %
5-40 SYRINGE (ML) INJECTION EVERY 12 HOURS SCHEDULED
Status: DISCONTINUED | OUTPATIENT
Start: 2023-08-24 | End: 2023-08-24 | Stop reason: HOSPADM

## 2023-08-24 RX ORDER — ASPIRIN 325 MG
650 TABLET ORAL
COMMUNITY

## 2023-08-24 RX ORDER — MIDAZOLAM HYDROCHLORIDE 1 MG/ML
INJECTION INTRAMUSCULAR; INTRAVENOUS PRN
Status: DISCONTINUED | OUTPATIENT
Start: 2023-08-24 | End: 2023-08-24 | Stop reason: HOSPADM

## 2023-08-24 NOTE — PROGRESS NOTES
CATH LAB to RECOVERY ROOM REPORT    Procedure: [facility]    MD: RANULFO Mayo MD    The procedure was diagnostic only. Verbal Report given to Recovery Nurse on patient being transferred to Cardiac Cath Lab  for routine post-op. Patient stable upon transfer to . Vitals, mental status, MAR, procedural summary discussed with recovery RN.     Medication given during procedure:    Contrast:30mL                          Heparin:3000units     Versed:3mg                                                               Fentanyl:50mcg                                                                   Sheaths:    Right radial sheath pulled at 1352 , band at 12mL of air

## 2023-08-24 NOTE — PROGRESS NOTES
1530: Ambulated patient to the bathroom with a steady gait, voided without difficulty. Patient denies chest pain, shortness of breath, or dizziness. Returned to stretcher. Vital signs stable. 1545: band removed, dressing applied. Procedural site is clean, dry, and intact. No bleeding, no hematoma. Patient dressed self. Educated patient about their sedation precautions such as not driving, operating any machinery, or signing legal documents 24 hours post procedure. Reviewed discharge instructions, including medications and site care using the teach back method. Answered all questions. Verbalized understanding. Removed peripheral IV.    1550: Escorted to discharge area in a wheelchair with all of their belongings    Patient's spouse present to take patient home. Reviewed discharge instructions with patients' spouse, they verbalized understanding.

## 2023-08-24 NOTE — PROGRESS NOTES
Cardiac Cath Lab Recovery Arrival Note:      Staci Donovan arrived to Cardiac Cath Lab, Recovery Area. Staff introduced to patient. Patient identifiers verified with NAME and DATE OF BIRTH. Procedure verified with patient. Consent forms reviewed and signed by patient or authorized representative and verified. Allergies verified. Patient and family oriented to department. Patient and family informed of procedure and plan of care. Questions answered with review. Patient prepped for procedure, per orders from physician, prior to arrival.    Patient on cardiac monitor, non-invasive blood pressure, SPO2 monitor. On RA. Patient is A&Ox 4. Patient reports no complaints. Patient in stretcher, in low position, with side rails up, call bell within reach, patient instructed to call if assistance as needed. Patient prep in: Fast [employer]. Patient family has pager #   Family in: Raf Austin, wife [phone].    Prep by: Hannah Durham

## 2023-08-24 NOTE — PROCEDURES
Cardiac Catheterization Procedure Note   Patient: Margie Lundy  MRN: [id number]  SSN: [id number]   YOB: 1955 Age: 70 y.o.   Sex: male    Date of Procedure: 8/24/2023   Pre-procedure Diagnosis: Coronary Artery Disease  Post-procedure Diagnosis: Coronary Artery Disease  Procedure: Left Heart Cath  :  Dr. Anisha Johns MD    Assistant(s):  None  Anesthesia: Moderate Sedation   Estimated Blood Loss: Less than 10 mL   Specimens Removed: None  Findings:     Access: Rt radial    Patent LAD stent   Overall mild non-obstructive disease  Left dominant  LVEDP [id number]    Complications: None   Implants:  None  Signed by:  Anisha Johns MD  8/24/2023  2:03 PM

## 2025-08-19 ENCOUNTER — ANESTHESIA EVENT (OUTPATIENT)
Facility: HOSPITAL | Age: 70
End: 2025-08-19
Payer: MEDICARE

## 2025-08-19 RX ORDER — ASPIRIN 81 MG/1
81 TABLET ORAL DAILY
COMMUNITY

## 2025-08-19 RX ORDER — CALCIUM CARBONATE 500 MG/1
1 TABLET, CHEWABLE ORAL PRN
COMMUNITY

## 2025-08-19 RX ORDER — SODIUM CHLORIDE 0.9 % (FLUSH) 0.9 %
5-40 SYRINGE (ML) INJECTION EVERY 12 HOURS SCHEDULED
Status: DISCONTINUED | OUTPATIENT
Start: 2025-08-19 | End: 2025-08-20 | Stop reason: HOSPADM

## 2025-08-19 RX ORDER — HYDROXYCHLOROQUINE SULFATE 200 MG/1
400 TABLET, FILM COATED ORAL DAILY
COMMUNITY

## 2025-08-19 RX ORDER — SODIUM CHLORIDE 0.9 % (FLUSH) 0.9 %
5-40 SYRINGE (ML) INJECTION PRN
Status: DISCONTINUED | OUTPATIENT
Start: 2025-08-19 | End: 2025-08-20 | Stop reason: HOSPADM

## 2025-08-19 RX ORDER — SODIUM CHLORIDE 9 MG/ML
INJECTION, SOLUTION INTRAVENOUS PRN
Status: DISCONTINUED | OUTPATIENT
Start: 2025-08-19 | End: 2025-08-20 | Stop reason: HOSPADM

## 2025-08-20 ENCOUNTER — HOSPITAL ENCOUNTER (OUTPATIENT)
Facility: HOSPITAL | Age: 70
Setting detail: OUTPATIENT SURGERY
Discharge: HOME | End: 2025-08-20
Attending: INTERNAL MEDICINE | Admitting: INTERNAL MEDICINE
Payer: MEDICARE

## 2025-08-20 ENCOUNTER — ANESTHESIA (OUTPATIENT)
Facility: HOSPITAL | Age: 70
End: 2025-08-20
Payer: MEDICARE

## 2025-08-20 VITALS
SYSTOLIC BLOOD PRESSURE: 121 MMHG | BODY MASS INDEX: 25.46 KG/M2 | RESPIRATION RATE: 18 BRPM | WEIGHT: 162.2 LBS | DIASTOLIC BLOOD PRESSURE: 57 MMHG | HEIGHT: 67 IN | OXYGEN SATURATION: 100 % | HEART RATE: 71 BPM

## 2025-08-20 PROCEDURE — 2580000003 HC RX 258: Performed by: INTERNAL MEDICINE

## 2025-08-20 PROCEDURE — 7100000011 HC PHASE II RECOVERY - ADDTL 15 MIN: Performed by: INTERNAL MEDICINE

## 2025-08-20 PROCEDURE — 3600007502: Performed by: INTERNAL MEDICINE

## 2025-08-20 PROCEDURE — 3700000001 HC ADD 15 MINUTES (ANESTHESIA): Performed by: INTERNAL MEDICINE

## 2025-08-20 PROCEDURE — 7100000010 HC PHASE II RECOVERY - FIRST 15 MIN: Performed by: INTERNAL MEDICINE

## 2025-08-20 PROCEDURE — 88305 TISSUE EXAM BY PATHOLOGIST: CPT

## 2025-08-20 PROCEDURE — 2709999900 HC NON-CHARGEABLE SUPPLY: Performed by: INTERNAL MEDICINE

## 2025-08-20 PROCEDURE — 3600007512: Performed by: INTERNAL MEDICINE

## 2025-08-20 PROCEDURE — 3700000000 HC ANESTHESIA ATTENDED CARE: Performed by: INTERNAL MEDICINE

## 2025-08-20 PROCEDURE — 6360000002 HC RX W HCPCS: Performed by: NURSE ANESTHETIST, CERTIFIED REGISTERED

## 2025-08-20 PROCEDURE — 88342 IMHCHEM/IMCYTCHM 1ST ANTB: CPT

## 2025-08-20 RX ORDER — PANTOPRAZOLE SODIUM 40 MG/1
40 TABLET, DELAYED RELEASE ORAL
Qty: 90 TABLET | Refills: 3 | Status: SHIPPED | OUTPATIENT
Start: 2025-08-20

## 2025-08-20 RX ADMIN — LIDOCAINE HYDROCHLORIDE 100 MG: 20 INJECTION, SOLUTION EPIDURAL; INFILTRATION; INTRACAUDAL; PERINEURAL at 12:19

## 2025-08-20 RX ADMIN — PROPOFOL 200 MG: 10 INJECTION, EMULSION INTRAVENOUS at 12:29

## 2025-08-20 RX ADMIN — SODIUM CHLORIDE: 0.9 INJECTION, SOLUTION INTRAVENOUS at 12:10

## 2025-08-20 ASSESSMENT — PAIN SCALES - GENERAL: PAINLEVEL_OUTOF10: 0

## 2025-08-20 ASSESSMENT — PAIN - FUNCTIONAL ASSESSMENT: PAIN_FUNCTIONAL_ASSESSMENT: 0-10

## (undated) DEVICE — SET GRAV CK VLV NEEDLESS ST 3 GANGED 4WAY STPCOCK HI FLO 10

## (undated) DEVICE — SYRINGE ANGIO 10 CC BRL STD PRNT POLYCARB LT BLU MEDALLION

## (undated) DEVICE — CATHETER IV 22GA L1IN OD0.8382-0.9144MM ID0.6096-0.6858MM 382523

## (undated) DEVICE — CUSTOM KT PTCA INFL DEV K05 00053H

## (undated) DEVICE — CATHETER ETER DIAG L110CM OD5FR VASC PGTL COR AD PERFORMA

## (undated) DEVICE — KIT HND CTRL 3 W STPCOCK ROT END 54IN PREM HI PRSS TBNG AT

## (undated) DEVICE — GLIDESHEATH SLENDER ACCESS KIT: Brand: GLIDESHEATH SLENDER

## (undated) DEVICE — TUBING PRSS MON L6IN PVC M FEM CONN

## (undated) DEVICE — KIT MFLD ISOLATN NACL CNTRST PRT TBNG SPIK W/ PRSS TRNSDUC

## (undated) DEVICE — SYSTEM REPROC CBL 3 LD DISPOSABLE

## (undated) DEVICE — CATHETER DIAG 5FR L100CM LUMN ID0.047IN JL3.5 CRV 0 SIDE H

## (undated) DEVICE — KIT MED IMAG CNTRST AGNT W/ IOPAMIDOL REUSE

## (undated) DEVICE — CATH BLLN DIL 2.5 X12MM RX -- EUPHORA

## (undated) DEVICE — CATHETER IV 24GA L0.75IN OD0.6604-0.7366MM

## (undated) DEVICE — KIT ACCS INTRO 4FR L10CM NDL 21GA L7CM GWIRE L40CM

## (undated) DEVICE — SPLINT WR POS F/ARTERIAL ACC -- BX/10

## (undated) DEVICE — SPLINT WR VELC FOAM NEUT POS DISP FOR RAD ART ACC SFT STRP

## (undated) DEVICE — SPECIAL PROCEDURE DRAPE 32" X 34": Brand: SPECIAL PROCEDURE DRAPE

## (undated) DEVICE — BAND COMPR L24CM REG CLR PLAS HEMSTAT EXT HK AND LOOP RETEN

## (undated) DEVICE — Device: Brand: PROWATER

## (undated) DEVICE — FORCEP BX LG CAP 2.4 MMX120 CM W/ NDL YEL RADIAL JAW 4 DISP

## (undated) DEVICE — CATHETER ANGIO JR4 AD 5 FRX100 CM 25 CM PERFORMA

## (undated) DEVICE — GUIDEWIRE VASC L260CM 0.035IN J TIP L3MM PTFE FIX COR NAMIC

## (undated) DEVICE — STENT RONYX25018UX RESOLUTE ONYX 2.50X18
Type: IMPLANTABLE DEVICE | Status: NON-FUNCTIONAL
Brand: RESOLUTE ONYX™

## (undated) DEVICE — ANGIOGRAPHY KIT

## (undated) DEVICE — SENSOR PLSE OXMTR AD CBL L3FT ADH TRANSMISSIVE

## (undated) DEVICE — SYRINGE INFL 60ML DISP ALLIANCE II

## (undated) DEVICE — KIT AT-X65 ANGIOTOUCH HAND CONTROLLER

## (undated) DEVICE — CATHETER ANGIO JL3.5 AD 5 FRX100 CM PERFORMA

## (undated) DEVICE — IV START KIT WITH SECUREMENT DEVICES

## (undated) DEVICE — WASTE KIT - ST MARY: Brand: MEDLINE INDUSTRIES, INC.

## (undated) DEVICE — TR BAND RADIAL ARTERY COMPRESSION DEVICE: Brand: TR BAND

## (undated) DEVICE — CATHETER DIAG 5FR L100CM LUMN ID0.047IN JR4 CRV 0 SIDE H

## (undated) DEVICE — CATHETER IV 18GA L1.16IN OD1.27-1.3462MM ID0.9398-1.016MM

## (undated) DEVICE — 3M™ TEGADERM™ TRANSPARENT FILM DRESSING FRAME STYLE, 1626W, 4 IN X 4-3/4 IN (10 CM X 12 CM), 50/CT 4CT/CASE: Brand: 3M™ TEGADERM™

## (undated) DEVICE — HEART CATH-MRMC: Brand: MEDLINE INDUSTRIES, INC.

## (undated) DEVICE — HI-TORQUE VERSACORE FLOPPY GUIDE WIRE SYSTEM 145 CM: Brand: HI-TORQUE VERSACORE

## (undated) DEVICE — SUPPLEMENT DIGESTIVE H2O SOL GI-EASE

## (undated) DEVICE — CATH GUID COR EB35 6FR 100CM -- LAUNCHER

## (undated) DEVICE — CATHETER IV 20GA L1.16IN OD1.0414-1.1176MM ID0.762-0.8382MM

## (undated) DEVICE — PACK PROCEDURE SURG HRT CATH